# Patient Record
Sex: MALE
[De-identification: names, ages, dates, MRNs, and addresses within clinical notes are randomized per-mention and may not be internally consistent; named-entity substitution may affect disease eponyms.]

---

## 2024-09-19 ENCOUNTER — P2P PATIENT RECORD (OUTPATIENT)
Age: 74
End: 2024-09-19

## 2024-09-26 ENCOUNTER — DASHBOARD ENCOUNTERS (OUTPATIENT)
Age: 74
End: 2024-09-26

## 2024-09-27 ENCOUNTER — OFFICE VISIT (OUTPATIENT)
Dept: URBAN - METROPOLITAN AREA CLINIC 35 | Facility: CLINIC | Age: 74
End: 2024-09-27

## 2024-09-27 RX ORDER — DONEPEZIL HYDROCHLORIDE 10 MG/1
TABLET, FILM COATED ORAL
Qty: 90 TABLET | Status: ACTIVE | COMMUNITY

## 2024-09-27 RX ORDER — METFORMIN ER 500 MG 500 MG/1
TAKE 1 TABLET BY MOUTH EVERY DAY TABLET ORAL
Qty: 90 EACH | Refills: 0 | Status: ACTIVE | COMMUNITY

## 2024-09-27 RX ORDER — ATORVASTATIN CALCIUM 40 MG/1
TAKE 1 TABLET BY MOUTH EVERY DAY TABLET, FILM COATED ORAL
Qty: 90 EACH | Refills: 0 | Status: ACTIVE | COMMUNITY

## 2024-09-27 RX ORDER — DONEPEZIL HYDROCHLORIDE 10 MG/1
TAKE 1 TABLET BY MOUTH EVERY DAY TABLET, FILM COATED ORAL
Qty: 90 EACH | Refills: 1 | Status: ACTIVE | COMMUNITY

## 2024-09-27 RX ORDER — CEFDINIR 300 MG/1
CAPSULE ORAL
Qty: 10 CAPSULE | Status: ACTIVE | COMMUNITY

## 2024-10-02 ENCOUNTER — OFFICE VISIT (OUTPATIENT)
Dept: URBAN - METROPOLITAN AREA CLINIC 35 | Facility: CLINIC | Age: 74
End: 2024-10-02

## 2024-10-04 ENCOUNTER — OFFICE VISIT (OUTPATIENT)
Dept: URBAN - METROPOLITAN AREA CLINIC 35 | Facility: CLINIC | Age: 74
End: 2024-10-04
Payer: COMMERCIAL

## 2024-10-04 ENCOUNTER — LAB OUTSIDE AN ENCOUNTER (OUTPATIENT)
Dept: URBAN - METROPOLITAN AREA CLINIC 35 | Facility: CLINIC | Age: 74
End: 2024-10-04

## 2024-10-04 VITALS
SYSTOLIC BLOOD PRESSURE: 112 MMHG | HEIGHT: 69 IN | BODY MASS INDEX: 33.77 KG/M2 | OXYGEN SATURATION: 97 % | DIASTOLIC BLOOD PRESSURE: 62 MMHG | WEIGHT: 228 LBS | HEART RATE: 113 BPM

## 2024-10-04 DIAGNOSIS — K92.1 MELENA: ICD-10-CM

## 2024-10-04 DIAGNOSIS — Z12.11 COLON CANCER SCREENING: ICD-10-CM

## 2024-10-04 DIAGNOSIS — R15.9 INCONTINENCE OF FECES, UNSPECIFIED FECAL INCONTINENCE TYPE: ICD-10-CM

## 2024-10-04 PROBLEM — 72042002: Status: ACTIVE | Noted: 2024-10-04

## 2024-10-04 PROBLEM — 305058001: Status: ACTIVE | Noted: 2024-10-04

## 2024-10-04 PROBLEM — 2901004: Status: ACTIVE | Noted: 2024-10-04

## 2024-10-04 PROCEDURE — 99204 OFFICE O/P NEW MOD 45 MIN: CPT | Performed by: HOSPITALIST

## 2024-10-04 RX ORDER — ATORVASTATIN CALCIUM 40 MG/1
TAKE 1 TABLET BY MOUTH EVERY DAY TABLET, FILM COATED ORAL
Qty: 90 EACH | Refills: 0 | Status: ACTIVE | COMMUNITY

## 2024-10-04 RX ORDER — DONEPEZIL HYDROCHLORIDE 10 MG/1
TAKE 1 TABLET BY MOUTH EVERY DAY TABLET, FILM COATED ORAL
Qty: 90 EACH | Refills: 1 | Status: ACTIVE | COMMUNITY

## 2024-10-04 NOTE — PHYSICAL EXAM HENT:
Head, normocephalic, atraumatic, Face, Face within normal limits, Ears, External ears within normal limits Problem: Safety - Adult  Goal: Free from fall injury  Outcome: Progressing     Problem: Neurosensory - Adult  Goal: Achieves stable or improved neurological status  Outcome: Progressing  Goal: Absence of seizures  Outcome: Progressing  Goal: Remains free of injury related to seizures activity  Outcome: Progressing  Goal: Achieves maximal functionality and self care  Outcome: Progressing

## 2024-10-04 NOTE — HPI-TODAY'S VISIT:
74-year-old male with past med history of multiple sclerosis, dementia on donepezil, hyperlipidemia presents to the gastroenterology clinic following recent ER visit to [facility] due to melena.  Patient is here along with her daughter who helps in history.  Patient does not remember much due to short-term memory loss from his dementia.  He lives close to his daughter who checks on him every day as well as cooks for him.  2 weeks back she noticed black tarry stool in diapers followed by pink-colored stool which made her to bring him to the [facility] emergency department.  Labs done did not show any drop in hemoglobin with normal hemoglobin range at 13.5-15.  CT abdomen did not show any major abnormality.  Rectal exam was normal and negative for occult blood.  Patient was recommended to follow-up with gastroenterologist to discuss about colonoscopy for colon cancer screening and EGD for recent episode of melena.  Patient not taking any blood thinners.  Her daughter reports patient having intermittent episodes of fecal incontinence in the last 1 to 2 years along with urinary incontinence which could be related to his worsening multiple sclerosis.Patient had colonoscopy more than 20 years ago and denies any family history of colon cancer.  No weight loss.

## 2024-10-21 ENCOUNTER — TELEPHONE ENCOUNTER (OUTPATIENT)
Dept: URBAN - METROPOLITAN AREA MEDICAL CENTER 10 | Facility: MEDICAL CENTER | Age: 74
End: 2024-10-21

## 2024-10-22 ENCOUNTER — OFFICE VISIT (OUTPATIENT)
Dept: URBAN - METROPOLITAN AREA SURGERY CENTER 8 | Facility: SURGERY CENTER | Age: 74
End: 2024-10-22

## 2024-10-23 ENCOUNTER — OFFICE VISIT (OUTPATIENT)
Dept: URBAN - METROPOLITAN AREA MEDICAL CENTER 10 | Facility: MEDICAL CENTER | Age: 74
End: 2024-10-23

## 2024-11-08 ENCOUNTER — OFFICE VISIT (OUTPATIENT)
Dept: URBAN - METROPOLITAN AREA CLINIC 35 | Facility: CLINIC | Age: 74
End: 2024-11-08
Payer: COMMERCIAL

## 2024-11-08 VITALS — OXYGEN SATURATION: 95 % | HEART RATE: 54 BPM | HEIGHT: 69 IN | BODY MASS INDEX: 33.77 KG/M2 | WEIGHT: 228 LBS

## 2024-11-08 DIAGNOSIS — D12.6 ADENOMATOUS POLYP OF COLON, UNSPECIFIED PART OF COLON: ICD-10-CM

## 2024-11-08 DIAGNOSIS — K59.09 CHRONIC CONSTIPATION: ICD-10-CM

## 2024-11-08 DIAGNOSIS — A04.8 H. PYLORI INFECTION: ICD-10-CM

## 2024-11-08 PROBLEM — 721730009: Status: ACTIVE | Noted: 2024-11-08

## 2024-11-08 PROBLEM — 428054006: Status: ACTIVE | Noted: 2024-11-08

## 2024-11-08 PROBLEM — 236069009: Status: ACTIVE | Noted: 2024-11-08

## 2024-11-08 PROCEDURE — 99214 OFFICE O/P EST MOD 30 MIN: CPT | Performed by: HOSPITALIST

## 2024-11-08 RX ORDER — DONEPEZIL HYDROCHLORIDE 10 MG/1
TAKE 1 TABLET BY MOUTH EVERY DAY TABLET, FILM COATED ORAL
Qty: 90 EACH | Refills: 1 | Status: ACTIVE | COMMUNITY

## 2024-11-08 RX ORDER — AMOXICILLIN 500 MG/1
2 CAPSULES CAPSULE ORAL TWICE DAILY
Qty: 28 | Refills: 0 | OUTPATIENT
Start: 2024-11-08 | End: 2024-11-22

## 2024-11-08 RX ORDER — ATORVASTATIN CALCIUM 40 MG/1
TAKE 1 TABLET BY MOUTH EVERY DAY TABLET, FILM COATED ORAL
Qty: 90 EACH | Refills: 0 | Status: ACTIVE | COMMUNITY

## 2024-11-08 RX ORDER — OMEPRAZOLE 20 MG/1
1 CAPSULE 30 MINUTES BEFORE A MEAL CAPSULE, DELAYED RELEASE ORAL TWICE A DAY
Qty: 28 | Refills: 0 | OUTPATIENT
Start: 2024-11-08

## 2024-11-08 RX ORDER — LEVOFLOXACIN 500 MG/1
1 TABLET TABLET, FILM COATED ORAL ONCE A DAY
Qty: 14 TABLET | Refills: 0 | OUTPATIENT
Start: 2024-11-08 | End: 2024-11-22

## 2024-11-08 NOTE — HPI-TODAY'S VISIT:
Patient present today for a 2 week follow-up from colonoscopy/egd.  Interval history -Patient have done well following EGD and colonoscopy without any complications. -Patient has caregiver with him in the morning who has been helping with bowel movements and fecal accidents has drastically reduced due to having good bowel movements. -Denies any other upper or lower GI symptoms and denies any recurrence of melena.  Colonoscopy (10.23.2024) Impression: -One 3mm polyp in the cecum, removed with a cold biopsy forceps. Resected and retrieved. -Two 3 to 8mm polyps in the ascending colon, removed with a cold snare. Resected and retrieved. -Diverticulosis in the sigmoid colon and in the descending colon. -Internal hemorrhoids. -The examination was otherwise normal on direct and reftroflexion views.  EGD (10.23.2024) Impression: -Esophagogastric landmarks identified. -Normal esophagus. -Erythematous mucosa in the antrum. Biopsied. -Erythematous duodenopathy.  Pathology:  A. Stomach, biopsy: -Helicobacter pylori-associated chonic active gastritis and focal intestinal metaplasia. -H. pylori-like microorganisms are present on Giemsa stain. -Intestinal metaplasia is highlighted by Alcian blue/PAS stain.  B. Cecum, polyp, biopsy/polypectomy: -Tubular adenoma, fragments.  C. Colon, ascending, polyp, biopsy/polypectomy: -Tubular adenoma, fragments.  (Last Visit 10.04.2024) 74-year-old male with past med history of multiple sclerosis, dementia on donepezil, hyperlipidemia presents to the gastroenterology clinic following recent ER visit to [facility] due to melena.  Patient is here along with her daughter who helps in history.  Patient does not remember much due to short-term memory loss from his dementia.  He lives close to his daughter who checks on him every day as well as cooks for him.  2 weeks back she noticed black tarry stool in diapers followed by pink-colored stool which made her to bring him to the [facility] emergency department.  Labs done did not show any drop in hemoglobin with normal hemoglobin range at 13.5-15.  CT abdomen did not show any major abnormality.  Rectal exam was normal and negative for occult blood.  Patient was recommended to follow-up with gastroenterologist to discuss about colonoscopy for colon cancer screening and EGD for recent episode of melena.  Patient not taking any blood thinners.  Her daughter reports patient having intermittent episodes of fecal incontinence in the last 1 to 2 years along with urinary incontinence which could be related to his worsening multiple sclerosis.Patient had colonoscopy more than 20 years ago and denies any family history of colon cancer.  No weight loss.

## 2024-12-09 ENCOUNTER — LAB OUTSIDE AN ENCOUNTER (OUTPATIENT)
Dept: URBAN - METROPOLITAN AREA CLINIC 35 | Facility: CLINIC | Age: 74
End: 2024-12-09